# Patient Record
Sex: FEMALE | Race: WHITE | NOT HISPANIC OR LATINO | Employment: FULL TIME | URBAN - METROPOLITAN AREA
[De-identification: names, ages, dates, MRNs, and addresses within clinical notes are randomized per-mention and may not be internally consistent; named-entity substitution may affect disease eponyms.]

---

## 2022-11-03 ENCOUNTER — TELEMEDICINE (OUTPATIENT)
Dept: FAMILY MEDICINE CLINIC | Facility: CLINIC | Age: 52
End: 2022-11-03

## 2022-11-03 DIAGNOSIS — J06.9 UPPER RESPIRATORY TRACT INFECTION, UNSPECIFIED TYPE: Primary | ICD-10-CM

## 2022-11-03 DIAGNOSIS — R05.1 ACUTE COUGH: ICD-10-CM

## 2022-11-03 DIAGNOSIS — R09.81 CONGESTION OF NASAL SINUS: ICD-10-CM

## 2022-11-03 LAB
SARS-COV-2 AG UPPER RESP QL IA: NEGATIVE
VALID CONTROL: NORMAL

## 2022-11-03 RX ORDER — LORATADINE 10 MG/1
10 TABLET ORAL
COMMUNITY

## 2022-11-03 RX ORDER — ESCITALOPRAM OXALATE 20 MG/1
1 TABLET ORAL DAILY
COMMUNITY
Start: 2022-06-03

## 2022-11-03 RX ORDER — LORATADINE 10 MG/1
TABLET ORAL
COMMUNITY

## 2022-11-03 RX ORDER — IBUPROFEN 200 MG
TABLET ORAL
COMMUNITY

## 2022-11-03 NOTE — PROGRESS NOTES
Virtual Regular Visit    Verification of patient location:    Patient is located in the following state in which I hold an active license [place]      Assessment/Plan:    Problem List Items Addressed This Visit    None     Visit Diagnoses     Upper respiratory tract infection, unspecified type    -  Primary    Acute cough        Relevant Orders    POCT Rapid Covid Ag (Completed)    Congestion of nasal sinus        Relevant Orders    POCT Rapid Covid Ag (Completed)        Patient is a 17-year-old female with symptoms consistent with viral upper respiratory infection  COVID swab was performed in office today was negative at this time  I recommended to continue with supportive care at home including Flonase, Mucinex, daily over-the-counter allergy pill  Continue to hydrate frequently  Patient is most bothersome complaint is cough  I recommended DayQuil or NyQuil on an as-needed basis  Patient declined prescription for Tessalon at this time  I did recommend a repeat COVID test at home in 3-4 days  Continue to hydrate frequently and follow-up in office if symptoms are not improving after a week of supportive care  Recommend supportive care with fluids, rest, flonase 1-2 sprays each nostril, otc claritin or zyrtec daily and mucinex as directed  Tylenol recommended prn for pain or fever  Instructed pt RTO if symptoms persist or worsen over the course of the next week       Reason for visit is   Chief Complaint   Patient presents with   • Cough     Congestion, headache, body aches        Encounter provider Jean Claude Haynes    Provider located at [place] [address] [address] [place] [id number]      Recent Visits  No visits were found meeting these conditions    Showing recent visits within past 7 days and meeting all other requirements  Today's Visits  Date Type Provider Dept   11/03/22 [address] [place] [address] [address] [place] [address] today's visits and meeting all other requirements  Future Appointments  No visits were found meeting these conditions  Showing future appointments within next 150 days and meeting all other requirements       The patient was identified by name and date of birth  Tigre Leonard was informed that this is a telemedicine visit and that the visit is being conducted through the [facility]  She agrees to proceed     My office door was closed  No one else was in the room  She acknowledged consent and understanding of privacy and security of the video platform  The patient has agreed to participate and understands they can discontinue the visit at any time  Patient is aware this is a billable service  Subjective  Tigre Leonard is a 50 y o  female new patient here for acute visit  Cough  Patient complains of dyspnea, headache frontal, myalgias, nasal congestion, nonproductive cough and rhinorrhea nasal  Symptoms began 1 days ago  Symptoms have been gradually worsening since that time  The cough is dry and is aggravated by nothing  Associated symptoms include: postnasal drip and shortness of breath  Patient does not have new pets  Patient does not have a history of asthma  Patient does have a history of environmental allergens  Patient has not traveled recently  Patient reports that she has had a recent covid exposure  She would like to be tested for covid  HPI     No past medical history on file  No past surgical history on file      Current Outpatient Medications   Medication Sig Dispense Refill   • ascorbic acid (VITAMIN C) 1000 MG tablet Take 500 mg by mouth     • Cholecalciferol 25 MCG (1000 UT) capsule Take 2,500 Units by mouth     • escitalopram (LEXAPRO) 20 mg tablet Take 1 tablet by mouth daily     • ibuprofen (MOTRIN) 200 mg tablet Take by mouth     • loratadine (CLARITIN) 10 mg tablet Take by mouth     • loratadine (CLARITIN) 10 mg tablet Take 10 mg by mouth     • Multiple Vitamins-Iron (MULTIVITAMIN/IRON PO) Take 1 capsule by mouth daily       No current facility-administered medications for this visit  Allergies   Allergen Reactions   • Latex Itching       Review of Systems   Constitutional: Positive for fatigue  Negative for fever  HENT: Positive for congestion, postnasal drip and rhinorrhea  Respiratory: Positive for cough and shortness of breath (intermittent)  Cardiovascular: Negative for chest pain  Gastrointestinal: Negative for abdominal pain, constipation, diarrhea, nausea and vomiting  Genitourinary: Negative for difficulty urinating  Musculoskeletal: Positive for myalgias  Neurological: Positive for headaches  Video Exam    There were no vitals filed for this visit  Physical Exam  Constitutional:       General: She is not in acute distress  Appearance: Normal appearance  She is ill-appearing  She is not toxic-appearing  HENT:      Head: Normocephalic and atraumatic  Right Ear: External ear normal       Left Ear: External ear normal       Nose: Congestion present  Eyes:      Extraocular Movements: Extraocular movements intact  Conjunctiva/sclera: Conjunctivae normal       Pupils: Pupils are equal, round, and reactive to light  Pulmonary:      Effort: Pulmonary effort is normal    Musculoskeletal:         General: Normal range of motion  Cervical back: Normal range of motion  Skin:     General: Skin is dry  Neurological:      Mental Status: She is alert and oriented to person, place, and time  Mental status is at baseline  Psychiatric:         Mood and Affect: Mood normal          Behavior: Behavior normal          Thought Content:  Thought content normal          Judgment: Judgment normal           I spent 15 minutes directly with the patient during this visit